# Patient Record
Sex: FEMALE | Race: BLACK OR AFRICAN AMERICAN | Employment: FULL TIME | URBAN - METROPOLITAN AREA
[De-identification: names, ages, dates, MRNs, and addresses within clinical notes are randomized per-mention and may not be internally consistent; named-entity substitution may affect disease eponyms.]

---

## 2017-05-26 ENCOUNTER — HOSPITAL ENCOUNTER (OUTPATIENT)
Dept: PHYSICAL THERAPY | Age: 57
End: 2017-05-26
Payer: COMMERCIAL

## 2017-05-31 ENCOUNTER — HOSPITAL ENCOUNTER (OUTPATIENT)
Dept: PHYSICAL THERAPY | Age: 57
Discharge: HOME | End: 2017-05-31
Payer: COMMERCIAL

## 2017-05-31 PROCEDURE — 97110 THERAPEUTIC EXERCISES: CPT

## 2017-05-31 PROCEDURE — 97162 PT EVAL MOD COMPLEX 30 MIN: CPT

## 2017-05-31 NOTE — PROGRESS NOTES
PT DAILY TREATMENT NOTE     Patient Name: Maxwell Hernandez  Date:2017  : 1960  [x]  Patient  Verified  Payor: Rosa Bennie / Plan: [facility] / Product Type: PPO /    In time: 9:25  Out time: 10:15  Total Treatment Time (min): 50  Total Timed Codes (min): 50  1:1 Treatment Time (min): 50   Visit #: 1 of     Treatment Area: Cervicalgia [M54.2]    SUBJECTIVE  Pain Level (0-10 scale): 3  Any medication changes, allergies to medications, adverse drug reactions, diagnosis change, or new procedure performed?: [x] No    [] Yes (see summary sheet for update)  Subjective functional status/changes:   [] No changes reported  See POC        OBJECTIVE  Posture: [] WNL  Head Position: forward,   Shoulder/Scapular Position:  C-Kyphosis:  [] increased   [] decreased   C-Lordosis:   [] increased   [] decreased  T-Kyphosis:  [] increased   [] decreased  T-Lordosis:   [] increased   [] decreased         Cervical Retraction: [] WNL    [] Abnormal:    Shoulder/Scapular Screen: [] WNL    [x] Abnormal: R scap winging    Active Movements: [] N/A   [] Too acute   [] Other:  ROM % AROM % PROM Comments:pain, area   Forward flexion WNL  \"pull\" right UT   Extension 90%  \"\"   SB right 30 deg     SB left  30 deg     Rotation right WNL  \"\"\"\"   Rotation left WNL       Thoracic Spine: [] N/A    [] WNL   [] Other:    PROM:    Palpation:  [] Min  [] Mod  [] Severe    Location:  [] Min  [] Mod  [] Severe    Location:  [] Min  [] Mod  [] Severe    Location:    Neuro Screen (myotome/dematome/felexes): [x] WNL  Myotome Level Muscle Test Myotome Level Muscle Test   C5 Shoulder Adduction - Deltoid C8 Finger Flexors   C6 Wrist Extension T1 Finger Abduction - Interossei   C7 Elbow Extension     Comments:  Upper Limb Tension Tests: [] N/A       Ulnar: [] R    [] L    [] +    [x] -         Median: [] R    [] L    [x] +    [] -  (L) elbow ext (-35 deg).   (R) elbow ext (-70) deg; \"tight, tingly\"       Radial: [] R    [] L    [] + [x] -    Special Tests:  Cervical:        Vertebral Artery:  [] R    [] L    [] +    [] -       Alar Ligament: [] R    [] L    [] +    [] -       Transverse Lig: [] R    [] L    [] +    [] -       Spurling's:  [x] R    [x] L    [x] +    [] -       Distraction:  [] R    [] L    [] +    [x] -       Compression: [] R    [] L    [] +    [x] -    Thoracic Outlet Tests: [x] N/A       Adson's:  [] R    [] L    [] +    [] -       Hyperabduction: [] R    [] L    [] +    [] -       Valerio's:  [] R    [] L    [] +    [] -       Brian:  [] R    [] L    [] +    [] -    Diaphragmatic Breathing: [x] Normal    [] Abnormal    Muscle Flexibility: [] N/A   Scalenes: [] WNL    [x] Tight    [] R    [] L   Upper Trap: [] WNL    [x] Tight    [] R    [] L   Levator: [] WNL    [x] Tight    [] R    [] L   Pect. Minor: [] WNL    [x] Tight    [] R    [] L    Global Muscular Weakness: [] N/A   Lower Trap: 4-   Rhomboids: 4+   Middle Trap: 4+   Serratus Ant: 5   Ext Rotators: 5   Other:    Other tests/comments:  Accessory joint motions: cervical side glides restricted on Right (Grade II)              10 min Therapeutic Exercise:  [x] See flow sheet and pt ed below   Rationale: increase ROM, increase strength and increase proprioception to improve the patients ability to perform functional mobility/ADLs and attain goals. min Patient Education: [x] Review HEP, handout created and issued to pt. as per chart. Pt educated in spinal anatomy, function and dysfunction as related to diagnosis. Instructed in neutral cervical spinal alignment seated/standing and postural corrections throughout the day. Reviewed sleeping postures and advised on use of towel roll for improved cervical spine support and to avoid overhead arm to reduce neural tension.      Other Objective/Functional Measures:     Pain Level (0-10 scale) post treatment: 3    ASSESSMENT/Changes in Function: See POC    Patient will continue to benefit from skilled PT services to modify and progress therapeutic interventions, address ROM deficits, address strength deficits, analyze and address soft tissue restrictions, analyze and cue movement patterns, analyze and modify body mechanics/ergonomics and assess and modify postural abnormalities to attain remaining goals. []  See Plan of Care  []  See progress note/recertification  []  See Discharge Summary         Progress towards goals / Updated goals:  See POC    PLAN  []  Upgrade activities as tolerated     [x]  Continue plan of care  []  Update interventions per flow sheet       []  Discharge due to:_  []  Other:_      Kendell Borrero, PT 5/31/2017  8:39 AM

## 2017-05-31 NOTE — PROGRESS NOTES
[address] [facility] AT [address], [address] [place] [address] Road  Phone: [phone]   [phone]  PLAN OF CARE / [address] [place] [address] PHYSICAL THERAPY SERVICES  Patient Name: Keaton Chase : 1960   Medical   Diagnosis: Cervicalgia [M54.2] Treatment Diagnosis: Cervicalgia [M54.2]   Onset Date: chronic     Referral Source: Georgia Lanza MD Start [facility]): 2017   Prior Hospitalization: See medical history Provider #: [id number]   Prior Level of Function: Independent ADL's/ IADL's. Works full time in office (desk work)   Comorbidities: Lupus, arthritis,    Medications: Verified on Patient Summary List   The Plan of Care and following information is based on the information from the initial evaluation.   ===========================================================================================  Assessment / key information:  Pt is a 65y.o. year old, right handed female with subjective complaints of neck and right shoulder pain; acute exacerbation over the past few months. She recently f/u with rheumatologist; confirms via xrays dx of ankylosing hyperostosis. Reports intermittent numbness/tingling to right fingers. Current pain is rated as 3 to 8/10. Current functional limitations: prolonged driving, prolonged sitting/desk work. FOTO score= 66/100 indicating 34% impairment to functional activities. Today's evaulation is significant for 1) mild forward head posture, right scapular winging. 2) Cervical motions are grossly WNL with bilaterally decreased cervical SB to 30 degrees and pain c/o to end range flexion, extension and Right side bending. 3) Myotomal testing WNL; minimal strength deficits noted to lower traps and mid traps. 4) Special testing: (+) Right ULTT median nerve (-40) degrees of elbow extension right vs. Left. Bilateral Spurlings. 5) limited accessory glides for right side glides at C2-3, C3-4.   Pt will be a good candidate for skilled PT to address these impairments and promote return to normal ADLs and functional mobility for improved quality of life.    ===========================================================================================  History MEDIUM  Complexity : 1-2 comorbidities / personal factors will impact the outcome/ POC ; Examination MEDIUM Complexity : 3 Standardized tests and measures addressing body structure, function, activity limitation and / or participation in recreation ; Presentation MEDIUM Complexity : Evolving with changing characteristics ; Decision Making MEDIUM Complexity : FOTO score of 26-74; Complexity MEDIUM  Problem List: pain affecting function, decrease ROM, decrease strength, decrease ADL/ functional abilitiies, decrease activity tolerance and decrease flexibility/ joint mobility   Treatment Plan may include any combination of the following: Therapeutic exercise, Therapeutic activities, Neuromuscular re-education, Physical agent/modality, Manual therapy and Patient education  Patient / Family readiness to learn indicated by: asking questions, trying to perform skills and interest  Persons(s) to be included in education: patient (P)  Barriers to Learning/Limitations: None  Measures taken:    Patient Goal (s): \"ease pain level\"   Rehabilitation Potential: good   Short Term Goals: To be accomplished in  2  weeks:  1. Pt will be educated in appropriate HEP for self-management of symptoms. 2.  Pt will be educated on sitting posture modification to reduce musculoskeletal strain with sitting ADL's.   3. Pt will report at least 50% improvement in frequency/intensity of right UE radicular symptoms with ADL's.  Long Term Goals: To be accomplished in  4-6  weeks:  1. Pt will improve FOTO score to >/= 76 to demo a significant improvement in functional activity tolerance. 2. Pt will achieve >/= +5 GROC in order to promote increased activity tolerance.   3. Pt will demonstrate neutral spinal alignment during PT sessions to improve axial stability for reduced pain with ADL's.   4. Pt will report max pain </= 5/10 with ADL's to promote improved sitting tolerance for work duties. Frequency / Duration:   Patient to be seen  2  times per week for 4-6  weeks:  Patient / Caregiver education and instruction: activity modification and exercises  G-Codes (GP): n/a  Therapist Signature: Bhavin Borrero, PT Date: 8/13/0758   Certification Period: n/a Time: 8:38 AM   ===========================================================================================  I certify that the above Physical Therapy Services are being furnished while the patient is under my care. I agree with the treatment plan and certify that this therapy is necessary. Physician Signature:        Date:       Time:     Please sign and return to [facility] or you may fax the signed copy to [phone]. Thank you.

## 2017-06-06 ENCOUNTER — HOSPITAL ENCOUNTER (OUTPATIENT)
Dept: PHYSICAL THERAPY | Age: 57
Discharge: HOME | End: 2017-06-06
Payer: COMMERCIAL

## 2017-06-06 PROCEDURE — 97110 THERAPEUTIC EXERCISES: CPT

## 2017-06-06 NOTE — PROGRESS NOTES
PT DAILY TREATMENT NOTE     Patient Name: Madina Teresa  Date:2017  : 1960  [x]  Patient  Verified  Payor: BLUE CROSS / [facility] / Product Type: PPO /    In time:821  Out time:909  Total Treatment Time (min): 48  Visit #: 2 of 12    Treatment Area: Cervicalgia [M54.2]    SUBJECTIVE  Pain Level (0-10 scale): 3  Any medication changes, allergies to medications, adverse drug reactions, diagnosis change, or new procedure performed?: [x] No    [] Yes (see summary sheet for update)  Subjective functional status/changes:   [] No changes reported  \"I feel okay, just some numbness in the R fingers\"    OBJECTIVE  Modality rationale: PD   Min Type Additional Details    [] Estim: []Att   []Unatt        []TENS instruct                  []IFC  []Premod   []NMES                     []Other:  []w/US   []w/ice   []w/heat  Position:  Location:    []  Traction: [] Cervical       []Lumbar                       [] Prone          []Supine                       []Intermittent   []Continuous Lbs:  [] before manual  [] after manual    []  Ultrasound: []Continuous   [] Pulsed                           []1MHz   []3MHz Location:  W/cm2:    []  Iontophoresis with dexamethasone         Location: [] Take home patch   [] In clinic    []  Ice     []  heat  []  Ice massage Position:  Location:    []  Vasopneumatic Device Pressure:       [] lo [] med [] hi   Temperature: [] lo [] med [] hi   [x] Skin assessment post-treatment:  [x]intact []redness- no adverse reaction       []redness - adverse reaction:       48 min Therapeutic Exercise:  [x] See flow sheet :   Rationale: increase ROM and increase strength to improve the patients ability to perform functional ADL's            X min Patient Education: [x] Review HEP    [] Progressed/Changed HEP based on:   [] positioning   [] body mechanics   [] transfers   [] heat/ice application        Other Objective/Functional Measures: Initiated therex today per flow sheet, requiring vc and demo 100% of the time for proper form and technique. Pain Level (0-10 scale) post treatment: 0    ASSESSMENT/Changes in Function: Pt reports constant numbness in the R thumb and first two fingers. Pt reports increased pain in the R shoulder today compared to the L. Patient will continue to benefit from skilled PT services to modify and progress therapeutic interventions, address functional mobility deficits, address ROM deficits, address strength deficits, analyze and address soft tissue restrictions, analyze and cue movement patterns, analyze and modify body mechanics/ergonomics and assess and modify postural abnormalities to attain remaining goals. [x]  See Plan of Care  []  See progress note/recertification  []  See Discharge Summary         Progress towards goals / Updated goals:  All goals reviewed and progressing appropriately as of 6/6/2017     PLAN  [x]  Upgrade activities as tolerated     [x]  Continue plan of care  []  Update interventions per flow sheet       []  Discharge due to:_  []  Other:_      Trudi Leonard PT 6/6/2017  6:37 AM    Future Appointments  Date Time Provider Wendy Samson   6/6/2017 8:30 AM Trudi Leonard PT [facility]   6/8/2017 3:00 PM Lanny Tomas [email or web address] [facility]   6/13/2017 1:30 PM Trudi Leonard PT [facility]   6/15/2017 4:00 PM Lanny KELLY [facility]   6/20/2017 1:30 PM Trudi Leonard PT [facility]   6/22/2017 4:00 PM Lanny KELLY [facility]   6/27/2017 2:30 PM Trudi Leonard PT [facility]   6/29/2017 4:00 PM Lanny KELLY [facility]

## 2017-06-08 ENCOUNTER — HOSPITAL ENCOUNTER (OUTPATIENT)
Dept: PHYSICAL THERAPY | Age: 57
Discharge: HOME | End: 2017-06-08
Payer: COMMERCIAL

## 2017-06-08 PROCEDURE — 97110 THERAPEUTIC EXERCISES: CPT

## 2017-06-08 PROCEDURE — 97140 MANUAL THERAPY 1/> REGIONS: CPT

## 2017-06-08 NOTE — PROGRESS NOTES
PT DAILY TREATMENT NOTE     Patient Name: Hermes Huffman  Date:2017  : 1960  [x]  Patient  Verified  Payor: BLUE CROSS / Plan: [address] [place] [address] [place] / Product Type: PPO /    In time:1035  Out time:1131  Total Treatment Time (min): 64  Visit #: 3 of 12    Treatment Area: Cervicalgia [M54.2]    SUBJECTIVE  Pain Level (0-10 scale): 7  Any medication changes, allergies to medications, adverse drug reactions, diagnosis change, or new procedure performed?: [x] No    [] Yes (see summary sheet for update)  Subjective functional status/changes:   [] No changes reported  \"I had a really stressful day yesterday and I think that has mad my pain jump up. I am at a 7/10 today\"    OBJECTIVE  Modality rationale: To decrease pain and increase tissue tension in order to improve cervical AROM and mobility.     Min Type Additional Details    [] Estim: []Att   []Unatt        []TENS instruct                  []IFC  []Premod   []NMES                     []Other:  []w/US   []w/ice   []w/heat  Position:  Location:    []  Traction: [] Cervical       []Lumbar                       [] Prone          []Supine                       []Intermittent   []Continuous Lbs:  [] before manual  [] after manual    []  Ultrasound: []Continuous   [] Pulsed                           []1MHz   []3MHz Location:  W/cm2:    []  Iontophoresis with dexamethasone         Location: [] Take home patch   [] In clinic   10 []  Ice     [x]  heat  []  Ice massage Position:semireclined  Location:C/S    []  Vasopneumatic Device Pressure:       [] lo [] med [] hi   Temperature: [] lo [] med [] hi   [x] Skin assessment post-treatment:  [x]intact []redness- no adverse reaction       []redness - adverse reaction:       36 min Therapeutic Exercise:  [x] See flow sheet :   Rationale: increase ROM and increase strength to improve the patients ability to perform functional ADL's      10 min Manual Therapy: STM to R UT, C/S paraspinals in sitting   Rationale: increase ROM and decreased tissue tension  to improve the patients ability to perform C/S AROM          X min Patient Education: [x] Review HEP    [] Progressed/Changed HEP based on:   [] positioning   [] body mechanics   [] transfers   [] heat/ice application        Other Objective/Functional Measures:     Pain Level (0-10 scale) post treatment: 3    ASSESSMENT/Changes in Function: Pt presented with increased pain in the R side of the C/S today possibly due to increased stressors at work yesterday. Added STM to R C/S paraspinals and UT to address increased pain. Patient flying out of town this weekend and educated on continuing HEP as well as performing heat as patient is able. Continues to need work on R UT tension and AROM in all directions. Patient will continue to benefit from skilled PT services to modify and progress therapeutic interventions, address functional mobility deficits, address ROM deficits, address strength deficits, analyze and address soft tissue restrictions, analyze and cue movement patterns, analyze and modify body mechanics/ergonomics and assess and modify postural abnormalities to attain remaining goals. [x]  See Plan of Care  []  See progress note/recertification  []  See Discharge Summary         Progress towards goals / Updated goals:  All goals reviewed and progressing appropriately as of 6/8/2017     PLAN  [x]  Upgrade activities as tolerated     [x]  Continue plan of care  []  Update interventions per flow sheet       []  Discharge due to:_  []  Other:_      Aristeo Sousa PT 6/8/2017  6:37 AM    Future Appointments  Date Time Provider Wendy Samson   6/13/2017 1:30 PM Sheldon Aschoff Houchins, PT [facility]   6/15/2017 4:00 PM Elle [facility]   6/20/2017 1:30 PM Sheldon Aschoff Houchins, PT [facility]   6/22/2017 4:00 PM Elle KELLY [facility]   6/27/2017 2:30 PM Sheldon Aschoff Houchins, PT [facility]   6/29/2017 4:00 PM Elle KELLY [facility]

## 2017-06-13 ENCOUNTER — APPOINTMENT (OUTPATIENT)
Dept: PHYSICAL THERAPY | Age: 57
End: 2017-06-13
Payer: COMMERCIAL

## 2017-06-20 ENCOUNTER — HOSPITAL ENCOUNTER (OUTPATIENT)
Dept: PHYSICAL THERAPY | Age: 57
Discharge: HOME | End: 2017-06-20
Payer: COMMERCIAL

## 2017-06-20 PROCEDURE — 97140 MANUAL THERAPY 1/> REGIONS: CPT

## 2017-06-20 PROCEDURE — 97110 THERAPEUTIC EXERCISES: CPT

## 2017-06-20 NOTE — PROGRESS NOTES
PT DAILY TREATMENT NOTE     Patient Name: Madina Teresa  Date:2017  : 1960  [x]  Patient  Verified  Payor: BLUE CROSS / Plan: [facility] / Product Type: PPO /    In time:437 Out time:524  Total Treatment Time (min): 47  Visit #: 4 of 12    Treatment Area: Cervicalgia [M54.2]    SUBJECTIVE  Pain Level (0-10 scale): 4.5  Any medication changes, allergies to medications, adverse drug reactions, diagnosis change, or new procedure performed?: [x] No    [] Yes (see summary sheet for update)  Subjective functional status/changes:   [] No changes reported  Pt reports continued difficulty with sleeping over the last several days possibly due to being out of town. OBJECTIVE  Modality rationale: To decrease pain and increase tissue tension in order to improve cervical AROM and mobility.     Min Type Additional Details    [] Estim: []Att   []Unatt        []TENS instruct                  []IFC  []Premod   []NMES                     []Other:  []w/US   []w/ice   []w/heat  Position:  Location:    []  Traction: [] Cervical       []Lumbar                       [] Prone          []Supine                       []Intermittent   []Continuous Lbs:  [] before manual  [] after manual    []  Ultrasound: []Continuous   [] Pulsed                           []1MHz   []3MHz Location:  W/cm2:    []  Iontophoresis with dexamethasone         Location: [] Take home patch   [] In clinic   PD []  Ice     [x]  heat  []  Ice massage Position:semireclined  Location:C/S    []  Vasopneumatic Device Pressure:       [] lo [] med [] hi   Temperature: [] lo [] med [] hi   [x] Skin assessment post-treatment:  [x]intact []redness- no adverse reaction       []redness - adverse reaction:       37 min Therapeutic Exercise:  [x] See flow sheet :   Rationale: increase ROM and increase strength to improve the patients ability to perform functional ADL's      10 min Manual Therapy: STM to R UT, C/S paraspinals in sitting   Rationale: increase ROM and decreased tissue tension  to improve the patients ability to perform C/S AROM          X min Patient Education: [x] Review HEP    [] Progressed/Changed HEP based on:   [] positioning   [] body mechanics   [] transfers   [] heat/ice application        Other Objective/Functional Measures:     Pain Level (0-10 scale) post treatment: 4    ASSESSMENT/Changes in Function: STG's assessed- see below. Pt continues to have increased pain on the R side of the C/S and UT with decreased pain following manual today. 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 Will continue to progress therex within current POC as patient is able. Patient will continue to benefit from skilled PT services to modify and progress therapeutic interventions, address functional mobility deficits, address ROM deficits, address strength deficits, analyze and address soft tissue restrictions, analyze and cue movement patterns, analyze and modify body mechanics/ergonomics and assess and modify postural abnormalities to attain remaining goals. [x]  See Plan of Care  []  See progress note/recertification  []  See Discharge Summary         Progress towards goals / Updated goals: All goals reviewed and progressing appropriately as of 6/20/2017   1. Pt will be educated in appropriate HEP for self-management of symptoms- goal met  2.  Pt will be educated on sitting posture modification to reduce musculoskeletal strain with sitting ADL's- goal met    PLAN  [x]  Upgrade activities as tolerated     [x]  Continue plan of care  []  Update interventions per flow sheet       []  Discharge due to:_  []  Other:_      Soledad Leonard PT 6/20/2017  6:37 AM    Future Appointments  Date Time Provider Wendy Samson   6/20/2017 4:30 PM Soledad Leonard PT [facility]   6/22/2017 4:00 PM Barron KELLY [facility]   6/27/2017 4:30 PM Soledad Leonard PT [facility]   6/29/2017 4:00 PM Barron Herrera [facility]

## 2017-06-22 ENCOUNTER — HOSPITAL ENCOUNTER (OUTPATIENT)
Dept: PHYSICAL THERAPY | Age: 57
Discharge: HOME | End: 2017-06-22
Payer: COMMERCIAL

## 2017-06-22 PROCEDURE — 97140 MANUAL THERAPY 1/> REGIONS: CPT

## 2017-06-22 PROCEDURE — 97110 THERAPEUTIC EXERCISES: CPT

## 2017-06-22 NOTE — PROGRESS NOTES
PT DAILY TREATMENT NOTE     Patient Name: Beverlyn Siemens  Date:2017  : 1960  [x]  Patient  Verified  Payor: BLUE CROSS / Plan: [employer] [facility] [place] / Product Type: PPO /    In time:4:08  Out time:4:53  Total Treatment Time (min): 45  Total Timed Codes (min): 45  1:1 Treatment Time (min):    Visit #: 5 of 12    Treatment Area: Cervicalgia [M54.2]    SUBJECTIVE  Pain Level (0-10 scale): 0  Any medication changes, allergies to medications, adverse drug reactions, diagnosis change, or new procedure performed?: [x] No    [] Yes (see summary sheet for update)  Subjective functional status/changes:   [] No changes reported  Pt reports last week was a bad week, but this was the first week where she has less pain and better neck mobility.     OBJECTIVE  Modality rationale: Pt deferred   Min Type Additional Details    [] Estim: []Att   []Unatt        []TENS instruct                  []IFC  []Premod   []NMES                     []Other:  []w/US   []w/ice   []w/heat  Position:  Location:    []  Traction: [] Cervical       []Lumbar                       [] Prone          []Supine                       []Intermittent   []Continuous Lbs:  [] before manual  [] after manual    []  Ultrasound: []Continuous   [] Pulsed                           []1MHz   []3MHz Location:  W/cm2:    []  Iontophoresis with dexamethasone         Location: [] Take home patch   [] In clinic    []  Ice     []  heat  []  Ice massage Position:  Location:    []  Vasopneumatic Device Pressure:       [] lo [] med [] hi   Temperature: [] lo [] med [] hi   [] Skin assessment post-treatment:  []intact []redness- no adverse reaction       []redness - adverse reaction:       33 min Therapeutic Exercise:  [] See flow sheet :   Rationale: increase ROM and increase strength to improve the patients ability to change and maintain head and shoulder positions    12 min Manual Therapy:  STM to R cervical paraspinals, R median n glides   Rationale: increase tissue extensibility to improve ability to change and maintain head and shoulder positions    Pain Level (0-10 scale) post treatment: 0    ASSESSMENT/Changes in Function: Pt demonstrates significant median nerve mobility deficits which will be addressed with further therapy. Patient will continue to benefit from skilled PT services to modify and progress therapeutic interventions, analyze and address soft tissue restrictions and analyze and cue movement patterns to attain remaining goals. []  See Plan of Care  []  See progress note/recertification  []  See Discharge Summary         Progress towards goals / Updated goals:   LTG: Pt will report at least 50% improvement in frequency/intensity of right UE radicular symptoms with ADL's.  Not met    PLAN  []  Upgrade activities as tolerated     [x]  Continue plan of care  []  Update interventions per flow sheet       []  Discharge due to:_  []  Other:_      Keshia Serna, PT 6/22/2017  4:22 PM

## 2017-06-27 ENCOUNTER — HOSPITAL ENCOUNTER (OUTPATIENT)
Dept: PHYSICAL THERAPY | Age: 57
Discharge: HOME | End: 2017-06-27
Payer: COMMERCIAL

## 2017-06-27 PROCEDURE — 97110 THERAPEUTIC EXERCISES: CPT

## 2017-06-27 PROCEDURE — 97140 MANUAL THERAPY 1/> REGIONS: CPT

## 2017-06-27 NOTE — PROGRESS NOTES
PT DAILY TREATMENT NOTE     Patient Name: Leslie White  Date:2017  : 1960  [x]  Patient  Verified  Payor: BLUE CROSS / Plan: [facility] / Product Type: PPO /    In time:432  Out time:517  Total Treatment Time (min): 45  Visit #: 6 of 12    Treatment Area: Cervicalgia [M54.2]    SUBJECTIVE  Pain Level (0-10 scale): 2  Any medication changes, allergies to medications, adverse drug reactions, diagnosis change, or new procedure performed?: [x] No    [] Yes (see summary sheet for update)  Subjective functional status/changes:   [] No changes reported  I feel better since coming.     OBJECTIVE  Modality rationale: Pt deferred   Min Type Additional Details    [] Estim: []Att   []Unatt        []TENS instruct                  []IFC  []Premod   []NMES                     []Other:  []w/US   []w/ice   []w/heat  Position:  Location:    []  Traction: [] Cervical       []Lumbar                       [] Prone          []Supine                       []Intermittent   []Continuous Lbs:  [] before manual  [] after manual    []  Ultrasound: []Continuous   [] Pulsed                           []1MHz   []3MHz Location:  W/cm2:    []  Iontophoresis with dexamethasone         Location: [] Take home patch   [] In clinic    []  Ice     []  heat  []  Ice massage Position:  Location:    []  Vasopneumatic Device Pressure:       [] lo [] med [] hi   Temperature: [] lo [] med [] hi   [] Skin assessment post-treatment:  []intact []redness- no adverse reaction       []redness - adverse reaction:       33 min Therapeutic Exercise:  [] See flow sheet :   Rationale: increase ROM and increase strength to improve the patients ability to change and maintain head and shoulder positions    12 min Manual Therapy:  STM to R cervical paraspinals, R median n glides   Rationale: increase tissue extensibility to improve ability to change and maintain head and shoulder positions    Pain Level (0-10 scale) post treatment: 0    ASSESSMENT/Changes in Function: Pt was educated on median nerve glide for home due to increased tension noted with manual. Will continue to progress therex within current POC as patient is able. Patient will continue to benefit from skilled PT services to modify and progress therapeutic interventions, analyze and address soft tissue restrictions and analyze and cue movement patterns to attain remaining goals.      []  See Plan of Care  []  See progress note/recertification  []  See Discharge Summary         Progress towards goals / Updated goals:      PLAN  []  Upgrade activities as tolerated     [x]  Continue plan of care  []  Update interventions per flow sheet       []  Discharge due to:_  []  Other:_      Lucero Leonard, PT 6/27/2017  4:22 PM

## 2017-06-29 ENCOUNTER — HOSPITAL ENCOUNTER (OUTPATIENT)
Dept: PHYSICAL THERAPY | Age: 57
End: 2017-06-29
Payer: COMMERCIAL

## 2017-07-05 ENCOUNTER — HOSPITAL ENCOUNTER (OUTPATIENT)
Dept: PHYSICAL THERAPY | Age: 57
Discharge: HOME | End: 2017-07-05
Payer: COMMERCIAL

## 2017-07-05 PROCEDURE — 97110 THERAPEUTIC EXERCISES: CPT

## 2017-07-05 PROCEDURE — 97140 MANUAL THERAPY 1/> REGIONS: CPT

## 2017-07-05 NOTE — PROGRESS NOTES
PT DAILY TREATMENT NOTE     Patient Name: Radha Ear  Date:2017  : 1960  [x]  Patient  Verified  Payor: BLUE CROSS / Plan: [employer] [facility] [place] / Product Type: PPO /    In time:452 Out time:540  Total Treatment Time (min): 48  Visit #: 7 of 12    Treatment Area: Cervicalgia [M54.2]    SUBJECTIVE  Pain Level (0-10 scale):3  Any medication changes, allergies to medications, adverse drug reactions, diagnosis change, or new procedure performed?: [x] No    [] Yes (see summary sheet for update)  Subjective functional status/changes:   [] No changes reported  SEE PN    OBJECTIVE  Modality rationale: Pt deferred   Min Type Additional Details    [] Estim: []Att   []Unatt        []TENS instruct                  []IFC  []Premod   []NMES                     []Other:  []w/US   []w/ice   []w/heat  Position:  Location:    []  Traction: [] Cervical       []Lumbar                       [] Prone          []Supine                       []Intermittent   []Continuous Lbs:  [] before manual  [] after manual    []  Ultrasound: []Continuous   [] Pulsed                           []1MHz   []3MHz Location:  W/cm2:    []  Iontophoresis with dexamethasone         Location: [] Take home patch   [] In clinic    []  Ice     []  heat  []  Ice massage Position:  Location:    []  Vasopneumatic Device Pressure:       [] lo [] med [] hi   Temperature: [] lo [] med [] hi   [] Skin assessment post-treatment:  []intact []redness- no adverse reaction       []redness - adverse reaction:       38 min Therapeutic Exercise:  [] See flow sheet :   Rationale: increase ROM and increase strength to improve the patients ability to change and maintain head and shoulder positions    10 min Manual Therapy:  STM to R cervical paraspinals, R median n glides   Rationale: increase tissue extensibility to improve ability to change and maintain head and shoulder positions    Pain Level (0-10 scale) post treatment: 0    ASSESSMENT/Changes in Function: SEE PN      Patient will continue to benefit from skilled PT services to modify and progress therapeutic interventions, analyze and address soft tissue restrictions and analyze and cue movement patterns to attain remaining goals.      []  See Plan of Care  [x]  See progress note/recertification  []  See Discharge Summary         Progress towards goals / Updated goals:      PLAN  []  Upgrade activities as tolerated     [x]  Continue plan of care  []  Update interventions per flow sheet       []  Discharge due to:_  []  Other:_      Baltazar Leonard PT 7/5/2017  4:22 PM

## 2017-07-05 NOTE — PROGRESS NOTES
[facility] AT [place] [address], [place], [address] [place] [address] Road  Phone: [phone]   [phone]  PROGRESS NOTE  Patient Name: Octavia Garcia : 1960   Treatment/Medical Diagnosis: Cervicalgia [M54.2]   Referral Source: Marjorie Estrada MD     Date of Initial Visit: 17 Attended Visits: 7 Missed Visits: 2 CXL  0 NS's     SUMMARY OF TREATMENT  Pt was seen on 17 for an initial evaluation for Neck pain with R radiculopathy. Pt has been seen for 7 visits and therapy has included: therapeutic exercise, manual therapy, modalities for pain control, patient education and HEP. CURRENT STATUS  Pt has been making good progress in therapy since their IE on 17. Pt reports 70% overall improvement with functional ADL's since beginning PT. Pt's pain range 2-7/10. Patient's therapy has consisted of focusing on improving C.S mobility, improving UT/LV tension for improved AROM, as well as decreasing neural tension of the median nerve. Patient has demonstrated the following functional improvements in therapy decreased pain in the C/S, decreased radicular symptoms in the R UE and hand. 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 Pt would continue to benefit from skilled PT in order to address the remaining goals and deficits. Pt has progressed towards the following goals listed below:    · Short Term Goals: To be accomplished in  2  weeks:  1. Pt will be educated in appropriate HEP for self-management of symptoms- goal met  2. Pt will be educated on sitting posture modification to reduce musculoskeletal strain with sitting ADL's- goal met  3.  Pt will report at least 50% improvement in frequency/intensity of right UE radicular symptoms with ADL's- goal met    · Long Term Goals: To be accomplished in  4-6  weeks:  1. Pt will improve FOTO score to >/= 76 to demo a significant improvement in functional activity tolerance- goal not met  2. Pt will achieve >/= +5 GROC in order to promote increased activity tolerance- will assess at DC  3. Pt will demonstrate neutral spinal alignment during PT sessions to improve axial stability for reduced pain with ADL's- goal progressing, continues to require cuing for postural awareness   4. Pt will report max pain </= 5/10 with ADL's to promote improved sitting tolerance for work duties- goal met     New Goals to be achieved in __5__  treatments:  1. Pt will improve FOTO score to >/= 76 to demo a significant improvement in functional activity tolerance   2. Pt will demonstrate neutral spinal alignment during PT sessions to improve axial stability for reduced pain with ADL's   3. Pt will achieve >/= +5 GROC in order to promote increased activity tolerance   4. Patient will report 75% overall improvement in symptoms in order to return to PLOF and improve ADL's. G-Codes (GP): NA  RECOMMENDATIONS  Continue skilled PT 5 remaining visits left on POC (12 total visits) then reassess for continuation or DC from therapy. If you have any questions/comments please contact us directly at [phone]. Thank you for allowing us to assist in the care of your patient. Therapist Signature: Pat Dubon, PT Date: 7/5/2017   Reporting Period NA Time: 12:44 PM   NOTE TO PHYSICIAN:  PLEASE COMPLETE THE ORDERS BELOW AND FAX TO   [facility] at [facility] UNIT: [phone].   If you are unable to process this request in 24 hours please contact our office: [phone].    ___ I have read the above report and request that my patient continue as recommended.   ___ I have read the above report and request that my patient continue therapy with the following changes/special instructions:_________________________________________________________   ___ I have read the above report and request that my patient be discharged from therapy.      Physician Signature:        Date:       Time:

## 2017-07-06 ENCOUNTER — HOSPITAL ENCOUNTER (OUTPATIENT)
Dept: PHYSICAL THERAPY | Age: 57
End: 2017-07-06
Payer: COMMERCIAL

## 2017-07-13 ENCOUNTER — HOSPITAL ENCOUNTER (OUTPATIENT)
Dept: PHYSICAL THERAPY | Age: 57
Discharge: HOME | End: 2017-07-13
Payer: COMMERCIAL

## 2017-07-13 PROCEDURE — 97140 MANUAL THERAPY 1/> REGIONS: CPT

## 2017-07-13 PROCEDURE — 97110 THERAPEUTIC EXERCISES: CPT

## 2017-07-13 NOTE — PROGRESS NOTES
PT DAILY TREATMENT NOTE     Patient Name: Britton Bellamy  Date:2017  : 1960  [x]  Patient  Verified  Payor: BLUE CROSS / Plan: [facility] / Product Type: PPO /    In time:4:41  Out time:5:30  Total Treatment Time (min): 49  Total Timed Codes (min): 49  1:1 Treatment Time (min):    Visit #: 8 of 12    Treatment Area: Cervicalgia [M54.2]    SUBJECTIVE  Pain Level (0-10 scale): 0  Any medication changes, allergies to medications, adverse drug reactions, diagnosis change, or new procedure performed?: [x] No    [] Yes (see summary sheet for update)  Subjective functional status/changes:   [] No changes reported  Today my pain is doing better, but I do notice weakness in my right arm- like with driving for instance.      OBJECTIVE  Modality rationale: Patient deferred   Min Type Additional Details    [] Estim: []Att   []Unatt        []TENS instruct                  []IFC  []Premod   []NMES                     []Other:  []w/US   []w/ice   []w/heat  Position:  Location:    []  Traction: [] Cervical       []Lumbar                       [] Prone          []Supine                       []Intermittent   []Continuous Lbs:  [] before manual  [] after manual    []  Ultrasound: []Continuous   [] Pulsed                           []1MHz   []3MHz Location:  W/cm2:    []  Iontophoresis with dexamethasone         Location: [] Take home patch   [] In clinic    []  Ice     []  heat  []  Ice massage Position:  Location:    []  Vasopneumatic Device Pressure:       [] lo [] med [] hi   Temperature: [] lo [] med [] hi   [] Skin assessment post-treatment:  []intact []redness- no adverse reaction       []redness - adverse reaction:       37 min Therapeutic Exercise:  [] See flow sheet :   Rationale: increase ROM and increase strength to improve the patients ability to change and maintain head and shoulder positions    12 min Manual Therapy:  STM to R cervical paraspinals and UT, R median nerve glides   Rationale: increase tissue extensibility to improve ability to change and maintain head and shoulder positions      Pain Level (0-10 scale) post treatment: 0    ASSESSMENT/Changes in Function: Today pt was progressed to prone W lifts and CT stabilizations against wall to improve scapular stability/shoulder strength. As per her subjective reports pt needs further work in these areas and seems to be progressing well with cervical mobility. Patient will continue to benefit from skilled PT services to modify and progress therapeutic interventions, address strength deficits, analyze and address soft tissue restrictions and analyze and cue movement patterns to attain remaining goals.      []  See Plan of Care  []  See progress note/recertification  []  See Discharge Summary         PLAN  []  Upgrade activities as tolerated     [x]  Continue plan of care  []  Update interventions per flow sheet       []  Discharge due to:_  []  Other:_      Leonor Turpin, PT 7/13/2017  6:26 PM

## 2017-07-18 ENCOUNTER — HOSPITAL ENCOUNTER (OUTPATIENT)
Dept: PHYSICAL THERAPY | Age: 57
End: 2017-07-18
Payer: COMMERCIAL

## 2017-07-20 ENCOUNTER — HOSPITAL ENCOUNTER (OUTPATIENT)
Dept: PHYSICAL THERAPY | Age: 57
Discharge: HOME | End: 2017-07-20
Payer: COMMERCIAL

## 2017-07-20 PROCEDURE — 97110 THERAPEUTIC EXERCISES: CPT

## 2017-07-20 PROCEDURE — 97140 MANUAL THERAPY 1/> REGIONS: CPT

## 2017-07-20 NOTE — PROGRESS NOTES
PT DAILY TREATMENT NOTE     Patient Name: Flavio Correa  Date:2017  : 1960  [x]  Patient  Verified  Payor: BLUE CROSS / Plan: [facility] / Product Type: PPO /    In time:4:06  Out time:4:50  Total Treatment Time (min): 44  Total Timed Codes (min): 44  1:1 Treatment Time (min):    Visit #: 9 of 12    Treatment Area: Cervicalgia [M54.2]    SUBJECTIVE  Pain Level (0-10 scale): 0  Any medication changes, allergies to medications, adverse drug reactions, diagnosis change, or new procedure performed?: [x] No    [] Yes (see summary sheet for update)  Subjective functional status/changes:   [] No changes reported  The new strength exercises are a little challenging, but I think they will help.     OBJECTIVE  Modality rationale: Patient deferred     Min Type Additional Details    [] Estim: []Att   []Unatt        []TENS instruct                  []IFC  []Premod   []NMES                     []Other:  []w/US   []w/ice   []w/heat  Position:  Location:    []  Traction: [] Cervical       []Lumbar                       [] Prone          []Supine                       []Intermittent   []Continuous Lbs:  [] before manual  [] after manual    []  Ultrasound: []Continuous   [] Pulsed                           []1MHz   []3MHz Location:  W/cm2:    []  Iontophoresis with dexamethasone         Location: [] Take home patch   [] In clinic    []  Ice     []  heat  []  Ice massage Position:  Location:    []  Vasopneumatic Device Pressure:       [] lo [] med [] hi   Temperature: [] lo [] med [] hi   [] Skin assessment post-treatment:  []intact []redness- no adverse reaction       []redness - adverse reaction:       33 min Therapeutic Exercise:  [] See flow sheet :   Rationale: increase ROM and increase strength to improve the patients ability to change and maintain head and shoulder positions    11 min Manual Therapy:  STM to R scalenes, levator scap and UT   Rationale: increase tissue extensibility to improve the patient's ability to change and maintain head and shoulder positions    Pain Level (0-10 scale) post treatment: 0    ASSESSMENT/Changes in Function:   Patient will continue to benefit from skilled PT services to modify and progress therapeutic interventions, address strength deficits, analyze and address soft tissue restrictions and analyze and cue movement patterns to attain remaining goals.      []  See Plan of Care  []  See progress note/recertification  []  See Discharge Summary    PLAN  []  Upgrade activities as tolerated     [x]  Continue plan of care  []  Update interventions per flow sheet       []  Discharge due to:_  []  Other:_      Ileana Lane, PT 7/20/2017  11:44 AM

## 2017-07-25 ENCOUNTER — HOSPITAL ENCOUNTER (OUTPATIENT)
Dept: PHYSICAL THERAPY | Age: 57
End: 2017-07-25
Payer: COMMERCIAL

## 2017-08-01 ENCOUNTER — HOSPITAL ENCOUNTER (OUTPATIENT)
Dept: PHYSICAL THERAPY | Age: 57
Discharge: HOME | End: 2017-08-01
Payer: COMMERCIAL

## 2017-08-01 PROCEDURE — 97110 THERAPEUTIC EXERCISES: CPT

## 2017-08-01 NOTE — PROGRESS NOTES
PT DAILY TREATMENT NOTE     Patient Name: Nery Niño  Date:2017  : 1960  [x]  Patient  Verified  Payor: BLUE CROSS / Plan: [facility] / Product Type: PPO /    In time:418 Out time:456  Total Treatment Time (min): 38  Visit #: 10 of 12    Treatment Area: Cervicalgia [M54.2]    SUBJECTIVE  Pain Level (0-10 scale): 0  Any medication changes, allergies to medications, adverse drug reactions, diagnosis change, or new procedure performed?: [x] No    [] Yes (see summary sheet for update)  Subjective functional status/changes:   [] No changes reported  \"I think I had a bad lupus flare up last week where my whole side has been hurting\"  OBJECTIVE  Modality rationale: Patient deferred     Min Type Additional Details    [] Estim: []Att   []Unatt        []TENS instruct                  []IFC  []Premod   []NMES                     []Other:  []w/US   []w/ice   []w/heat  Position:  Location:    []  Traction: [] Cervical       []Lumbar                       [] Prone          []Supine                       []Intermittent   []Continuous Lbs:  [] before manual  [] after manual    []  Ultrasound: []Continuous   [] Pulsed                           []1MHz   []3MHz Location:  W/cm2:    []  Iontophoresis with dexamethasone         Location: [] Take home patch   [] In clinic    []  Ice     []  heat  []  Ice massage Position:  Location:    []  Vasopneumatic Device Pressure:       [] lo [] med [] hi   Temperature: [] lo [] med [] hi   [] Skin assessment post-treatment:  []intact []redness- no adverse reaction       []redness - adverse reaction:       38 min Therapeutic Exercise:  [] See flow sheet :   Rationale: increase ROM and increase strength to improve the patients ability to change and maintain head and shoulder positions    TC min Manual Therapy:  STM to R scalenes, levator scap and UT   Rationale: increase tissue extensibility to improve the patient's ability to change and maintain head and shoulder positions    Pain Level (0-10 scale) post treatment: 0    ASSESSMENT/Changes in Function: Pt's therex was modified today due to patient being 20+ minutes late to therapy today. Continues to have moderate tightness of the R UT and scparular region. Will continue to progress therex within current POC as patient is able. Patient will continue to benefit from skilled PT services to modify and progress therapeutic interventions, address strength deficits, analyze and address soft tissue restrictions and analyze and cue movement patterns to attain remaining goals.      []  See Plan of Care  []  See progress note/recertification  []  See Discharge Summary    PLAN  []  Upgrade activities as tolerated     [x]  Continue plan of care  []  Update interventions per flow sheet       []  Discharge due to:_  []  Other:_      Baltazar Leonard PT 8/1/2017  11:44 AM

## 2017-08-07 ENCOUNTER — HOSPITAL ENCOUNTER (OUTPATIENT)
Dept: PHYSICAL THERAPY | Age: 57
Discharge: HOME | End: 2017-08-07
Payer: COMMERCIAL

## 2017-08-07 PROCEDURE — 97110 THERAPEUTIC EXERCISES: CPT

## 2017-08-07 PROCEDURE — 97140 MANUAL THERAPY 1/> REGIONS: CPT

## 2017-08-07 NOTE — PROGRESS NOTES
[facility] AT [address], [address]  Phone: [phone]   [phone]  PROGRESS NOTE  Patient Name: Irma Fuentes : 1960   Treatment/Medical Diagnosis: Cervicalgia [M54.2]   Referral Source: Marguerite Rhodes MD     Date of Initial Visit: 17 Attended Visits: 10 Missed Visits: 8     SUMMARY OF TREATMENT  Therapeutic exercise for cervical mobility, posture and scapular stabilization, manual therapy, HEP  CURRENT STATUS  Patient reports good improvement with therapy, including reduction of right arm radicular symptoms. She continues with moderate strength deficits which we have worked on with therapy. She reports pain ranging from 0-5/10, worse with some sleep positions and prolonged head positions. At this point, pt has 3 more visits scheduled and agrees she would like to finish these remaining visits, followed by discharge. · Short Term Goals: To be accomplished in  2  weeks:  1.  Pt will be educated in appropriate HEP for self-management of symptoms- goal met  2.  Pt will be educated on sitting posture modification to reduce musculoskeletal strain with sitting ADL's- goal met  3. Pt will report at least 50% improvement in frequency/intensity of right UE radicular symptoms with ADL's- goal met       · Long Term Goals: To be accomplished in  4-6  weeks:  1. Pt will improve FOTO score to >/= 76 to demo a significant improvement in functional activity tolerance- goal not met  2. Pt will achieve >/= +5 GROC in order to promote increased activity tolerance- +6, met  3. Pt will demonstrate neutral spinal alignment during PT sessions to improve axial stability for reduced pain with ADL's- goal progressing, continues to require cuing for postural awareness   4.  Pt will report max pain </= 5/10 with ADL's to promote improved sitting tolerance for work duties- goal met    RECOMMENDATIONS  Continue 3 remaining therapy visits then be discharged with long term HEP. If you have any questions/comments please contact us directly at [phone]. Thank you for allowing us to assist in the care of your patient. Therapist Signature: Paul He PT, [facility] Date: 8/7/2017     Time: 1:52 PM   NOTE TO PHYSICIAN:  PLEASE COMPLETE THE ORDERS BELOW AND FAX TO   [facility] UNIT: [phone]. If you are unable to process this request in 24 hours please contact our office: [phone].    ___ I have read the above report and request that my patient continue as recommended.   ___ I have read the above report and request that my patient continue therapy with the following changes/special instructions:_________________________________________________________   ___ I have read the above report and request that my patient be discharged from therapy.      Physician Signature:        Date:       Time:

## 2017-08-09 ENCOUNTER — HOSPITAL ENCOUNTER (OUTPATIENT)
Dept: PHYSICAL THERAPY | Age: 57
Discharge: HOME | End: 2017-08-09
Payer: COMMERCIAL

## 2017-08-09 PROCEDURE — 97110 THERAPEUTIC EXERCISES: CPT

## 2017-08-09 NOTE — PROGRESS NOTES
PT DAILY TREATMENT NOTE     Patient Name: Carla Jarvis  Date:2017  : 1960  [x]  Patient  Verified  Payor: [employer] Manjinder / Plan:  [facility] [place] / Product Type: PPO /    In time:417  Out time:455  Total Treatment Time (min): 38  Visit #: 12 of     Treatment Area: Cervicalgia [M54.2]    SUBJECTIVE  Pain Level (0-10 scale): 3-4  Any medication changes, allergies to medications, adverse drug reactions, diagnosis change, or new procedure performed?: [x] No    [] Yes (see summary sheet for update)  Subjective functional status/changes:   [] No changes reported  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 Im not sure what he did but it felt great.       OBJECTIVE  Modality rationale: Patient deferred   Min Type Additional Details    [] Estim: []Att   []Unatt        []TENS instruct                  []IFC  []Premod   []NMES                     []Other:  []w/US   []w/ice   []w/heat  Position:  Location:    []  Traction: [] Cervical       []Lumbar                       [] Prone          []Supine                       []Intermittent   []Continuous Lbs:  [] before manual  [] after manual    []  Ultrasound: []Continuous   [] Pulsed                           []1MHz   []3MHz Location:  W/cm2:    []  Iontophoresis with dexamethasone         Location: [] Take home patch   [] In clinic    []  Ice     []  heat  []  Ice massage Position:  Location:    []  Vasopneumatic Device Pressure:       [] lo [] med [] hi   Temperature: [] lo [] med [] hi   [] Skin assessment post-treatment:  []intact []redness- no adverse reaction       []redness - adverse reaction:       38 min Therapeutic Exercise:  [] See flow sheet :   Rationale: increase ROM and increase strength to improve the patients ability to change and maintain head and shoulder positions    TC min Manual Therapy:  STM to R cervical paraspinals, stretching to UT/levator tissues   Rationale: increase tissue extensibility to improve the patient's ability to change and maintain head and shoulder positions    Pain Level (0-10 scale) post treatment: 0    ASSESSMENT/Changes in Function:   Patient will continue to benefit from skilled PT services to address strength deficits, analyze and address soft tissue restrictions and analyze and cue movement patterns to attain remaining goals.      []  See Plan of Care  [x]  See progress note/recertification  []  See Discharge Summary         PLAN  []  Upgrade activities as tolerated     [x]  Continue plan of care  []  Update interventions per flow sheet       []  Discharge due to:_  []  Other:_      Kerline Leonard, PT 8/9/2017  11:28 AM

## 2017-08-15 ENCOUNTER — APPOINTMENT (OUTPATIENT)
Dept: PHYSICAL THERAPY | Age: 57
End: 2017-08-15
Payer: COMMERCIAL

## 2017-08-17 ENCOUNTER — APPOINTMENT (OUTPATIENT)
Dept: PHYSICAL THERAPY | Age: 57
End: 2017-08-17
Payer: COMMERCIAL

## 2017-08-21 ENCOUNTER — APPOINTMENT (OUTPATIENT)
Dept: PHYSICAL THERAPY | Age: 57
End: 2017-08-21
Payer: COMMERCIAL

## 2017-08-23 ENCOUNTER — APPOINTMENT (OUTPATIENT)
Dept: PHYSICAL THERAPY | Age: 57
End: 2017-08-23
Payer: COMMERCIAL

## 2017-08-29 ENCOUNTER — APPOINTMENT (OUTPATIENT)
Dept: PHYSICAL THERAPY | Age: 57
End: 2017-08-29
Payer: COMMERCIAL

## 2017-09-06 NOTE — PROGRESS NOTES
[address] AT [address], [address] [place] [address] Road  Phone: [phone]   [phone]  DISCHARGE SUMMARY  Patient Name: Mikki Walton : 1960   Treatment/Medical Diagnosis: Cervicalgia [M54.2]   Referral Source: Germaine Callahan MD     Date of Initial Visit: 17 Attended Visits: 12 Missed Visits: 9     SUMMARY OF TREATMENT  Therapeutic exercise for cervical mobility, posture and scapular stabilization, manual therapy, HEP  CURRENT STATUS  Patient reports good improvement with therapy, including reduction of right arm radicular symptoms. She continues with moderate strength deficits which we have worked on with therapy. She reports pain ranging from 0-5/10, worse with some sleep positions and prolonged head positions. Patient was seen for a total of 12 visits and has not been seen in the last month. Patient was called to schedule and requested DC from therapy secondary to hectic work schedule at this time. Status of patient's goals at last PN is listed below. · Short Term Goals: To be accomplished in  2  weeks:  1.  Pt will be educated in appropriate HEP for self-management of symptoms- goal met  2.  Pt will be educated on sitting posture modification to reduce musculoskeletal strain with sitting ADL's- goal met  3. Pt will report at least 50% improvement in frequency/intensity of right UE radicular symptoms with ADL's- goal met       · Long Term Goals: To be accomplished in  4-6  weeks:  1. Pt will improve FOTO score to >/= 76 to demo a significant improvement in functional activity tolerance- goal not met  2. Pt will achieve >/= +5 GROC in order to promote increased activity tolerance- +6, met  3. Pt will demonstrate neutral spinal alignment during PT sessions to improve axial stability for reduced pain with ADL's- goal progressing, continues to require cuing for postural awareness   4.  Pt will report max pain </= 5/10 with ADL's to promote improved sitting tolerance for work duties- goal met  RECOMMENDATIONS  Other: Patient requests self DC at this time. If you have any questions/comments please contact us directly at [phone]. Thank you for allowing us to assist in the care of your patient.     Therapist Signature: Patric Chin PT Date: 9/6/2017   Reporting Period: NA Time: 1:29 PM

## 2017-09-14 ENCOUNTER — APPOINTMENT (OUTPATIENT)
Dept: PHYSICAL THERAPY | Age: 57
End: 2017-09-14

## 2018-04-10 ENCOUNTER — APPOINTMENT (OUTPATIENT)
Dept: PHYSICAL THERAPY | Age: 58
End: 2018-04-10

## 2018-04-23 ENCOUNTER — HOSPITAL ENCOUNTER (OUTPATIENT)
Dept: PHYSICAL THERAPY | Age: 58
Discharge: HOME | End: 2018-04-23
Payer: COMMERCIAL

## 2018-04-23 PROCEDURE — 97162 PT EVAL MOD COMPLEX 30 MIN: CPT

## 2018-04-23 PROCEDURE — 97110 THERAPEUTIC EXERCISES: CPT

## 2018-04-23 NOTE — PROGRESS NOTES
PT DAILY TREATMENT NOTE    Patient Name: Octavia Garcia  Date:2018  : 1960  [x]  Patient  Verified  Payor: BLUE CROSS / Plan:  [facility] [place] / Product Type: PPO /    In time:5:10  Out time:5:55  Total Treatment Time (min): 45  Total Timed Codes (min): 45  1:1 Treatment Time ( [address] only):    Visit #: 1 of 8    Physical Therapy Evaluation - Lumbar Spine     See Plan of Care for subjective history     OBJECTIVE  Posture: Increased lumbar lordosis    Gait:  [x] Normal     [] Abnormal:    Active Movements: [] N/A   [] Too acute   [] Other:  ROM % AROM % PROM Comments:pain, area   Forward flexion  WNL     Extension  WNL     SB right  WNL     SB left  WNL     Rotation right  75%     Rotation left  75%       Repeated Movement Testing:    Neuro Screen [] WNL  Myotome/Dermatome/Reflexes:  Comments:    Dural Mobility:  SLR Sitting: [] R    [] L    [] +    [] -  @ (degrees):           Supine: [] R    [] L    [] +    [x] -  @ (degrees):   Slump Test: [] R    [] L    [] +    [x] -  @ (degrees):   Prone Knee Bend: [] R    [] L    [] +    [] -     Palpation  [x] Min  [] Mod  [] Severe    Location:mid and low lumbar paraspinals  [] Min  [] Mod  [] Severe    Location:  [] Min  [] Mod  [] Severe    Location:      Strength   L(0-5) R (0-5) N/T   Hip Flexion (L1,2) 4+ 4+ []   Knee Extension (L3,4) 5 5 []   Ankle Dorsiflexion (L4) 5 5 []   Great Toe Extension (L5)   []   Ankle Plantarflexion (S1)   []   Knee Flexion (S1,2)   []   Upper Abdominals   []   Lower Abdominals   []   Paraspinals 4 4 []   Back Rotators   []   Gluteus Saul 4- 4- []   Other   []     Special Tests  Lumbar:  Lumb.  Compression: [] Pos  [x] Neg               Lumbar Distraction:   [] Pos  [x] Neg    Quadrant:  [] Pos  [] Neg   [] Flex  [] Ext    Sacroilliac:  Gaenslen's: [] R    [] L    [] +    [] -     Compression: [] +    [] -     Gapping:  [] +    [] -     Thigh Thrust: [] R    [] L    [] +    [] -     Leg Length: [] +    [] - Position:           Hip: [place] Pat:  [] R    [] L    [] +    [x] -     Scour:  [] R    [] L    [] +    [] -     Piriformis: [] R    [] L    [] +    [x] -          Deficits: Edgar's: [] R    [] L    [] +    [] -     Akash: [x] R    [x] L    [x] +    [] -     Hamstrings 90/90:    Gastrocsoleus (to neutral): Right: Left:    Other tests/comments:  OBJECTIVE  Modality (rationale):   []  E-Stim: type _ x _ min     []att   []unatt   []w/US   []w/ice   []w/heat  []  Traction: []cerv   []pelvic   _ lbs x _ min     []pro   []sup   []int   []const  []  Ultrasound: []cont   []pulse    _ W/cm2 x _  min   []1MHz   []3MHz  []  Iontophoresis: []take home patch w/ dexamethazone    []40mA   []80mA                               []_ mA min w/: []dexamethazone   []other:_  []  Ice pack _  min     [] Hot pack _  min     [] Paraffin _  min  []  Other:     Therapeutic Exercise: [x] see flow sheet     [] Other:_  Added/Changed Exercises:  [x]  Added:_See HEP  to improve (function): improve the patient's ability to change and maintain body/trunk positions  []  Changed:_ to improve (function):  (minutes:10 )    Other Objective/Functional Measures:   Pain Level (0-10 scale) post treatment: 0    ASSESSMENT  [x]  See Plan of Care  Patient is assigned a medium evaluation complexity based upon presence of 2 comorbidities, FOTO score, and changing/evolving symptom characteristics.     PLAN  See Lisette, [facility] 4/23/2018  5:16 PM

## 2018-04-23 NOTE — PROGRESS NOTES
[address] AT [address], [address] [place] [address] Road  Phone: [phone]   [phone]  PLAN OF CARE / [address] [place] [address] PHYSICAL THERAPY SERVICES  Patient Name: Paul Lucero : 1960   Medical   Diagnosis: Lumbago with sciatica, right side [M54.41] Treatment Diagnosis: Lumbago with sciatica, right side [M54.41]   Onset Date: 2018     Referral Source: Laura Rousseau MD Start of Care [facility]): 2018   Prior Hospitalization: See medical history Provider #: [id number]   Prior Level of Function: Independent w/ ADL's   Comorbidities: Lupus, arthritis   Medications: Verified on Patient Summary List   The Plan of Care and following information is based on the information from the initial evaluation.   ===========================================================================================  Assessment / key information:  Patient is a 66 y/o female presenting with chief c/o low and mid back pain of no known ADRIAN. Onst 3-4 months ago, but patient has long standing history of lupus. MRI shows degenerative changes of the hips. Pt reports 0/10 pain at rest, and states may come on suddenly and sharply with getting up from a chair, bending, twisting. Pain 0/10 at rest, with several sharp episodes of 10/10. Pt presents with increased standing lumbar lordosis, non-antalgic gait. Lumbar AROM is limited 25% with rotation bilaterally, and all other movements WFL. + Akash test R>L for psoas tightness. Poor abdominal and proximal hip/glute strengths are measured (4-/5). Mild TTP to mid and lower lumbar paraspinals. The patient was instructed in a home exercise program to address the above findings/deficits.  The patient should benefit from therapy services to progress toward functional goals and improve quality of life.  ===========================================================================================  History MEDIUM Complexity : 1-2 comorbidities / personal factors will impact the outcome/ POC ; Examination MEDIUM Complexity : 3 Standardized tests and measures addressing body structure, function, activity limitation and / or participation in recreation ; Presentation MEDIUM Complexity : Evolving with changing characteristics ; Decision Making MEDIUM Complexity : FOTO score of 26-74; Complexity MEDIUM  Problem List: pain affecting function, decrease ROM, decrease strength, decrease ADL/ functional abilitiies, decrease activity tolerance and decrease flexibility/ joint mobility   Treatment Plan may include any combination of the following: Therapeutic exercise, Therapeutic activities, Physical agent/modality, Manual therapy and Patient education  Patient / Family readiness to learn indicated by: asking questions, trying to perform skills and interest  Persons(s) to be included in education: patient (P)  Barriers to Learning/Limitations: None  Measures taken:    Patient Goal (s): To minimize pain   Patient self reported health status: good  Rehabilitation Potential: good   Short Term Goals: To be accomplished in  4  weeks:  Pt will be able to independently achieve neutral spine position  Independent/compliant with long term HEP for core stability and muscular flexibility  Patient will independently demonstrate proper lifting mechanics to promote lumbar safety and reduced injury risk   Long Term Goals:  To be accomplished in  8  weeks:  Improve FOTO outcome score by 10% to indicate a significant improvement in ADL function  Pt will report at least 50% functional improvement with basic changes in trunk position (getting up from chair, bending, twisting)  Frequency / Duration:   Patient to be seen  1  time per week for 8  weeks:  Patient / Caregiver education and instruction: activity modification and exercises  G-Codes (GP): HARRISON  Therapist Signature: Flavia Barillas PT, OCS Date: 1/99/3015   Certification Period: HARRISON Time: 5:17 PM ===========================================================================================  I certify that the above Physical Therapy Services are being furnished while the patient is under my care. I agree with the treatment plan and certify that this therapy is necessary. Physician Signature:        Date:       Time:     Please sign and return to [facility] or you may fax the signed copy to [phone]. Thank you.

## 2018-05-01 ENCOUNTER — APPOINTMENT (OUTPATIENT)
Dept: PHYSICAL THERAPY | Age: 58
End: 2018-05-01

## 2018-05-15 ENCOUNTER — APPOINTMENT (OUTPATIENT)
Dept: PHYSICAL THERAPY | Age: 58
End: 2018-05-15

## 2018-05-22 ENCOUNTER — APPOINTMENT (OUTPATIENT)
Dept: PHYSICAL THERAPY | Age: 58
End: 2018-05-22

## 2018-05-29 ENCOUNTER — APPOINTMENT (OUTPATIENT)
Dept: PHYSICAL THERAPY | Age: 58
End: 2018-05-29

## 2018-06-11 ENCOUNTER — HOSPITAL ENCOUNTER (OUTPATIENT)
Dept: PHYSICAL THERAPY | Age: 58
End: 2018-06-11

## 2018-10-16 NOTE — PROGRESS NOTES
[address] [facility] AT [address], [address] [place] [address] Road  Phone: [phone]   [phone]  DISCHARGE SUMMARY  Patient Name: Faith Dove : 1960   Treatment/Medical Diagnosis: Lumbago with sciatica, right side [M54.41]   Referral Source: Phuc Vilchis MD     Date of Initial Visit: 18 Attended Visits: 1 Missed Visits: 2     SUMMARY OF TREATMENT  Initial evaluation with HEP instruction  CURRENT STATUS  The patient attended the initial evaluation only with diagnosis of low back pain, and received instructions for HEP. The patient did not return to therapy after the initial visit and is being discharged from therapy with no known change of status. RECOMMENDATIONS  Discontinue therapy due to lack of attendance or compliance. If you have any questions/comments please contact us directly at [phone]. Thank you for allowing us to assist in the care of your patient.     Therapist Signature: Jessica Green PT, [facility] Date: 10/16/18     Time: 11:01 AM

## 2019-06-19 NOTE — PROGRESS NOTES
Individual Follow-Up Form    6/19/2019    Quit Date: 6-    Clinical Status of Patient: Outpatient    Length of Service: 30 minutes    Continuing Medication: yes  Patches    Other Medications: nicotine gum as needed     Target Symptoms: Withdrawal and medication side effects. The following were  rated moderate (3) to severe (4) on TCRS:  · Moderate (3): restless, irritable, anxious, vivid dreams, desire tobacco, increased appetitie  · Severe (4): none    Comments: Patient was seen today for a smoking cessation progress update. She states that she remains tobacco free at this time. She states that she is struggling with the urge to smoke yesterday and today. Discussed when she started the lower dose nicotine patches and she stated that she started the 14 mg nicotine patches on Monday. Discussed the lower nicotine dose. Discussed coping strategies. She states that she continues to use the nicotine gum as needed. Discussed healthy eating habits and increasing physical activities. The patient denies any abnormal behavioral or mental changes at this time. Will continue to encourage and monitor progress.    Diagnosis: F17.200    Next Visit: 2 weeks   PT DAILY TREATMENT NOTE     Patient Name: Kedar Martinez  Date:2017  : 1960  [x]  Patient  Verified  Payor: BLUE CROSS / Plan: [facility] / Product Type: PPO /    In time:4:08  Out time:4:58  Total Treatment Time (min): 50  Total Timed Codes (min): 50  1:1 Treatment Time (min):    Visit #: 10 of 12    Treatment Area: Cervicalgia [M54.2]    SUBJECTIVE  Pain Level (0-10 scale): 3-4  Any medication changes, allergies to medications, adverse drug reactions, diagnosis change, or new procedure performed?: [x] No    [] Yes (see summary sheet for update)  Subjective functional status/changes:   [] No changes reported  See Progress Note      OBJECTIVE  Modality rationale: Patient deferred   Min Type Additional Details    [] Estim: []Att   []Unatt        []TENS instruct                  []IFC  []Premod   []NMES                     []Other:  []w/US   []w/ice   []w/heat  Position:  Location:    []  Traction: [] Cervical       []Lumbar                       [] Prone          []Supine                       []Intermittent   []Continuous Lbs:  [] before manual  [] after manual    []  Ultrasound: []Continuous   [] Pulsed                           []1MHz   []3MHz Location:  W/cm2:    []  Iontophoresis with dexamethasone         Location: [] Take home patch   [] In clinic    []  Ice     []  heat  []  Ice massage Position:  Location:    []  Vasopneumatic Device Pressure:       [] lo [] med [] hi   Temperature: [] lo [] med [] hi   [] Skin assessment post-treatment:  []intact []redness- no adverse reaction       []redness - adverse reaction:       30 min Therapeutic Exercise:  [] See flow sheet :   Rationale: increase ROM and increase strength to improve the patients ability to change and maintain head and shoulder positions    10 min Manual Therapy:  STM to R cervical paraspinals, stretching to UT/levator tissues   Rationale: increase tissue extensibility to improve the patient's ability to change and maintain head and shoulder positions    Pain Level (0-10 scale) post treatment: 0    ASSESSMENT/Changes in Function:   Patient will continue to benefit from skilled PT services to address strength deficits, analyze and address soft tissue restrictions and analyze and cue movement patterns to attain remaining goals.      []  See Plan of Care  [x]  See progress note/recertification  []  See Discharge Summary         PLAN  []  Upgrade activities as tolerated     [x]  Continue plan of care  []  Update interventions per flow sheet       []  Discharge due to:_  []  Other:_      Ole Beck, PT 8/7/2017  11:28 AM